# Patient Record
Sex: FEMALE | Race: WHITE | NOT HISPANIC OR LATINO | Employment: OTHER | URBAN - METROPOLITAN AREA
[De-identification: names, ages, dates, MRNs, and addresses within clinical notes are randomized per-mention and may not be internally consistent; named-entity substitution may affect disease eponyms.]

---

## 2017-03-21 ENCOUNTER — FOLLOW UP (OUTPATIENT)
Dept: URBAN - METROPOLITAN AREA CLINIC 33 | Facility: CLINIC | Age: 77
End: 2017-03-21

## 2017-03-21 VITALS — SYSTOLIC BLOOD PRESSURE: 120 MMHG | HEART RATE: 60 BPM | HEIGHT: 55 IN | DIASTOLIC BLOOD PRESSURE: 82 MMHG

## 2017-03-21 DIAGNOSIS — H35.3132: ICD-10-CM

## 2017-03-21 DIAGNOSIS — H34.8320: ICD-10-CM

## 2017-03-21 DIAGNOSIS — H43.813: ICD-10-CM

## 2017-03-21 DIAGNOSIS — H35.372: ICD-10-CM

## 2017-03-21 PROCEDURE — 4177F TALK PT/CRGVR RE AREDS PREV: CPT

## 2017-03-21 PROCEDURE — 92235 FLUORESCEIN ANGRPH MLTIFRAME: CPT

## 2017-03-21 PROCEDURE — G8427 DOCREV CUR MEDS BY ELIG CLIN: HCPCS

## 2017-03-21 PROCEDURE — 2019F DILATED MACUL EXAM DONE: CPT

## 2017-03-21 PROCEDURE — 1036F TOBACCO NON-USER: CPT

## 2017-03-21 PROCEDURE — 92014 COMPRE OPH EXAM EST PT 1/>: CPT

## 2017-03-21 PROCEDURE — 92250 FUNDUS PHOTOGRAPHY W/I&R: CPT

## 2017-03-21 ASSESSMENT — VISUAL ACUITY
OD_SC: 20/20-1
OS_SC: 20/25+1

## 2017-03-21 ASSESSMENT — TONOMETRY
OS_IOP_MMHG: 12
OD_IOP_MMHG: 17

## 2017-09-25 ENCOUNTER — FOLLOW UP (OUTPATIENT)
Dept: URBAN - METROPOLITAN AREA CLINIC 33 | Facility: CLINIC | Age: 77
End: 2017-09-25

## 2017-09-25 VITALS
DIASTOLIC BLOOD PRESSURE: 72 MMHG | SYSTOLIC BLOOD PRESSURE: 130 MMHG | WEIGHT: 112 LBS | BODY MASS INDEX: 21.99 KG/M2 | HEIGHT: 60 IN | HEART RATE: 56 BPM

## 2017-09-25 DIAGNOSIS — H34.8320: ICD-10-CM

## 2017-09-25 DIAGNOSIS — H43.813: ICD-10-CM

## 2017-09-25 DIAGNOSIS — H35.3132: ICD-10-CM

## 2017-09-25 DIAGNOSIS — H35.372: ICD-10-CM

## 2017-09-25 DIAGNOSIS — H02.836: ICD-10-CM

## 2017-09-25 DIAGNOSIS — H02.833: ICD-10-CM

## 2017-09-25 PROCEDURE — 92014 COMPRE OPH EXAM EST PT 1/>: CPT

## 2017-09-25 PROCEDURE — G8417 CALC BMI ABV UP PARAM F/U: HCPCS

## 2017-09-25 PROCEDURE — 92226 OPHTHALMOSCOPY (SUB): CPT

## 2017-09-25 PROCEDURE — 92134 CPTRZ OPH DX IMG PST SGM RTA: CPT

## 2017-09-25 PROCEDURE — 92235 FLUORESCEIN ANGRPH MLTIFRAME: CPT

## 2017-09-25 PROCEDURE — 4177F TALK PT/CRGVR RE AREDS PREV: CPT

## 2017-09-25 PROCEDURE — G8427 DOCREV CUR MEDS BY ELIG CLIN: HCPCS

## 2017-09-25 PROCEDURE — 1036F TOBACCO NON-USER: CPT

## 2017-09-25 PROCEDURE — 2019F DILATED MACUL EXAM DONE: CPT

## 2017-09-25 ASSESSMENT — VISUAL ACUITY
OD_SC: 20/25+2
OS_SC: 20/25-1

## 2017-09-25 ASSESSMENT — TONOMETRY
OD_IOP_MMHG: 21
OS_IOP_MMHG: 15
OD_IOP_MMHG: 16

## 2018-03-26 ENCOUNTER — FOLLOW UP (OUTPATIENT)
Dept: URBAN - METROPOLITAN AREA CLINIC 33 | Facility: CLINIC | Age: 78
End: 2018-03-26

## 2018-03-26 VITALS
SYSTOLIC BLOOD PRESSURE: 138 MMHG | BODY MASS INDEX: 22.58 KG/M2 | HEART RATE: 68 BPM | DIASTOLIC BLOOD PRESSURE: 74 MMHG | HEIGHT: 60 IN | WEIGHT: 115 LBS

## 2018-03-26 DIAGNOSIS — H35.3132: ICD-10-CM

## 2018-03-26 DIAGNOSIS — H43.813: ICD-10-CM

## 2018-03-26 DIAGNOSIS — H35.372: ICD-10-CM

## 2018-03-26 DIAGNOSIS — H40.1110: ICD-10-CM

## 2018-03-26 DIAGNOSIS — H34.8320: ICD-10-CM

## 2018-03-26 PROCEDURE — 92134 CPTRZ OPH DX IMG PST SGM RTA: CPT

## 2018-03-26 PROCEDURE — 92250 FUNDUS PHOTOGRAPHY W/I&R: CPT

## 2018-03-26 PROCEDURE — 67028 INJECTION EYE DRUG: CPT

## 2018-03-26 PROCEDURE — 92014 COMPRE OPH EXAM EST PT 1/>: CPT

## 2018-03-26 PROCEDURE — 92235 FLUORESCEIN ANGRPH MLTIFRAME: CPT

## 2018-03-26 RX ORDER — TIMOLOL MALEATE 6.8 MG/ML: 1 SOLUTION OPHTHALMIC EVERY EVENING

## 2018-03-26 ASSESSMENT — VISUAL ACUITY
OS_SC: 20/30+1
OS_PH: 20/30+2
OD_SC: 20/25-2

## 2018-03-26 ASSESSMENT — TONOMETRY
OD_IOP_MMHG: 24
OS_IOP_MMHG: 16

## 2018-05-09 ENCOUNTER — FOLLOW UP AND POST INJECTION EVALUATION (OUTPATIENT)
Dept: URBAN - METROPOLITAN AREA CLINIC 33 | Facility: CLINIC | Age: 78
End: 2018-05-09

## 2018-05-09 VITALS — HEART RATE: 64 BPM | DIASTOLIC BLOOD PRESSURE: 70 MMHG | SYSTOLIC BLOOD PRESSURE: 130 MMHG | HEIGHT: 55 IN

## 2018-05-09 DIAGNOSIS — H35.3131: ICD-10-CM

## 2018-05-09 DIAGNOSIS — H35.372: ICD-10-CM

## 2018-05-09 DIAGNOSIS — H34.8320: ICD-10-CM

## 2018-05-09 DIAGNOSIS — H44.23: ICD-10-CM

## 2018-05-09 DIAGNOSIS — H43.813: ICD-10-CM

## 2018-05-09 DIAGNOSIS — H40.1110: ICD-10-CM

## 2018-05-09 PROCEDURE — 92012 INTRM OPH EXAM EST PATIENT: CPT

## 2018-05-09 PROCEDURE — 92250 FUNDUS PHOTOGRAPHY W/I&R: CPT

## 2018-05-09 PROCEDURE — 67028 INJECTION EYE DRUG: CPT

## 2018-05-09 ASSESSMENT — VISUAL ACUITY
OS_SC: 20/25+1
OD_SC: 20/25-1

## 2018-05-09 ASSESSMENT — TONOMETRY
OD_IOP_MMHG: 14
OS_IOP_MMHG: 15

## 2018-07-02 ENCOUNTER — FOLLOW UP (OUTPATIENT)
Dept: URBAN - METROPOLITAN AREA CLINIC 33 | Facility: CLINIC | Age: 78
End: 2018-07-02

## 2018-07-02 VITALS — DIASTOLIC BLOOD PRESSURE: 68 MMHG | SYSTOLIC BLOOD PRESSURE: 137 MMHG | HEART RATE: 65 BPM | HEIGHT: 55 IN

## 2018-07-02 DIAGNOSIS — H35.372: ICD-10-CM

## 2018-07-02 DIAGNOSIS — H34.8320: ICD-10-CM

## 2018-07-02 DIAGNOSIS — H40.1110: ICD-10-CM

## 2018-07-02 DIAGNOSIS — H35.3131: ICD-10-CM

## 2018-07-02 DIAGNOSIS — H43.813: ICD-10-CM

## 2018-07-02 DIAGNOSIS — H44.23: ICD-10-CM

## 2018-07-02 PROCEDURE — 67028 INJECTION EYE DRUG: CPT

## 2018-07-02 PROCEDURE — 92250 FUNDUS PHOTOGRAPHY W/I&R: CPT

## 2018-07-02 PROCEDURE — 92014 COMPRE OPH EXAM EST PT 1/>: CPT

## 2018-07-02 PROCEDURE — 92134 CPTRZ OPH DX IMG PST SGM RTA: CPT

## 2018-07-02 ASSESSMENT — VISUAL ACUITY
OD_SC: 20/30-1
OS_SC: 20/25-1

## 2018-07-02 ASSESSMENT — TONOMETRY
OS_IOP_MMHG: 14
OD_IOP_MMHG: 12

## 2018-07-09 ENCOUNTER — IMPORTED ENCOUNTER (OUTPATIENT)
Dept: URBAN - METROPOLITAN AREA CLINIC 43 | Facility: CLINIC | Age: 78
End: 2018-07-09

## 2018-07-17 ENCOUNTER — IMPORTED ENCOUNTER (OUTPATIENT)
Dept: URBAN - METROPOLITAN AREA CLINIC 43 | Facility: CLINIC | Age: 78
End: 2018-07-17

## 2018-07-17 PROBLEM — H35.372: Noted: 2018-07-17

## 2018-07-17 PROBLEM — H40.1134: Noted: 2018-07-17

## 2018-07-24 ENCOUNTER — IMPORTED ENCOUNTER (OUTPATIENT)
Dept: URBAN - METROPOLITAN AREA CLINIC 43 | Facility: CLINIC | Age: 78
End: 2018-07-24

## 2018-08-29 ENCOUNTER — FOLLOW UP (OUTPATIENT)
Dept: URBAN - METROPOLITAN AREA CLINIC 33 | Facility: CLINIC | Age: 78
End: 2018-08-29

## 2018-08-29 DIAGNOSIS — H34.8320: ICD-10-CM

## 2018-08-29 DIAGNOSIS — H35.3131: ICD-10-CM

## 2018-08-29 DIAGNOSIS — H35.372: ICD-10-CM

## 2018-08-29 DIAGNOSIS — H40.1110: ICD-10-CM

## 2018-08-29 DIAGNOSIS — H44.23: ICD-10-CM

## 2018-08-29 DIAGNOSIS — H43.813: ICD-10-CM

## 2018-08-29 PROCEDURE — 67028 INJECTION EYE DRUG: CPT

## 2018-08-29 PROCEDURE — 92012 INTRM OPH EXAM EST PATIENT: CPT

## 2018-08-29 PROCEDURE — 92250 FUNDUS PHOTOGRAPHY W/I&R: CPT

## 2018-08-29 PROCEDURE — 92134 CPTRZ OPH DX IMG PST SGM RTA: CPT

## 2018-08-29 ASSESSMENT — VISUAL ACUITY
OS_SC: 20/25-1
OD_SC: 20/25

## 2018-08-29 ASSESSMENT — TONOMETRY
OS_IOP_MMHG: 17
OD_IOP_MMHG: 16

## 2018-10-29 ENCOUNTER — FOLLOW UP (OUTPATIENT)
Dept: URBAN - METROPOLITAN AREA CLINIC 33 | Facility: CLINIC | Age: 78
End: 2018-10-29

## 2018-10-29 DIAGNOSIS — H44.23: ICD-10-CM

## 2018-10-29 DIAGNOSIS — H35.372: ICD-10-CM

## 2018-10-29 DIAGNOSIS — H35.3131: ICD-10-CM

## 2018-10-29 DIAGNOSIS — H34.8320: ICD-10-CM

## 2018-10-29 DIAGNOSIS — H40.1110: ICD-10-CM

## 2018-10-29 DIAGNOSIS — H43.813: ICD-10-CM

## 2018-10-29 PROCEDURE — 92134 CPTRZ OPH DX IMG PST SGM RTA: CPT

## 2018-10-29 PROCEDURE — 92014 COMPRE OPH EXAM EST PT 1/>: CPT

## 2018-10-29 PROCEDURE — 92250 FUNDUS PHOTOGRAPHY W/I&R: CPT

## 2018-10-29 PROCEDURE — 67028 INJECTION EYE DRUG: CPT

## 2018-10-29 ASSESSMENT — VISUAL ACUITY
OD_SC: 20/25+2
OS_SC: 20/25+1

## 2018-10-29 ASSESSMENT — TONOMETRY
OD_IOP_MMHG: 19
OS_IOP_MMHG: 13

## 2018-12-17 ENCOUNTER — FOLLOW UP AND POST INJECTION EVALUATION (OUTPATIENT)
Dept: URBAN - METROPOLITAN AREA CLINIC 33 | Facility: CLINIC | Age: 78
End: 2018-12-17

## 2018-12-17 DIAGNOSIS — H35.372: ICD-10-CM

## 2018-12-17 DIAGNOSIS — H34.8320: ICD-10-CM

## 2018-12-17 DIAGNOSIS — H40.1110: ICD-10-CM

## 2018-12-17 DIAGNOSIS — H35.3131: ICD-10-CM

## 2018-12-17 DIAGNOSIS — H43.813: ICD-10-CM

## 2018-12-17 DIAGNOSIS — H44.23: ICD-10-CM

## 2018-12-17 PROCEDURE — 92014 COMPRE OPH EXAM EST PT 1/>: CPT

## 2018-12-17 PROCEDURE — 92250 FUNDUS PHOTOGRAPHY W/I&R: CPT

## 2018-12-17 PROCEDURE — 67028 INJECTION EYE DRUG: CPT

## 2018-12-17 ASSESSMENT — TONOMETRY
OD_IOP_MMHG: 12
OS_IOP_MMHG: 12

## 2018-12-17 ASSESSMENT — VISUAL ACUITY
OD_SC: 20/25+2
OS_SC: 20/25+2

## 2019-01-09 ENCOUNTER — IMPORTED ENCOUNTER (OUTPATIENT)
Dept: URBAN - METROPOLITAN AREA CLINIC 43 | Facility: CLINIC | Age: 79
End: 2019-01-09

## 2019-01-09 PROBLEM — H40.1134: Noted: 2019-01-09

## 2019-01-09 PROBLEM — H02.831: Noted: 2019-01-09

## 2019-01-09 PROBLEM — H02.834: Noted: 2019-01-09

## 2019-02-14 ENCOUNTER — IMPORTED ENCOUNTER (OUTPATIENT)
Dept: URBAN - METROPOLITAN AREA CLINIC 43 | Facility: CLINIC | Age: 79
End: 2019-02-14

## 2019-02-14 PROBLEM — H02.831: Noted: 2019-02-14

## 2019-02-14 PROBLEM — H02.834: Noted: 2019-02-14

## 2019-02-14 PROBLEM — Z96.1: Noted: 2019-02-14

## 2019-02-18 ENCOUNTER — FOLLOW UP AND POST INJECTION EVALUATION (OUTPATIENT)
Dept: URBAN - METROPOLITAN AREA CLINIC 33 | Facility: CLINIC | Age: 79
End: 2019-02-18

## 2019-02-18 VITALS
HEIGHT: 60 IN | BODY MASS INDEX: 22.38 KG/M2 | SYSTOLIC BLOOD PRESSURE: 134 MMHG | HEART RATE: 62 BPM | WEIGHT: 114 LBS | DIASTOLIC BLOOD PRESSURE: 60 MMHG

## 2019-02-18 DIAGNOSIS — H40.1110: ICD-10-CM

## 2019-02-18 DIAGNOSIS — H35.372: ICD-10-CM

## 2019-02-18 DIAGNOSIS — H35.3131: ICD-10-CM

## 2019-02-18 DIAGNOSIS — H43.813: ICD-10-CM

## 2019-02-18 DIAGNOSIS — H34.8320: ICD-10-CM

## 2019-02-18 DIAGNOSIS — H44.23: ICD-10-CM

## 2019-02-18 PROCEDURE — 92250 FUNDUS PHOTOGRAPHY W/I&R: CPT

## 2019-02-18 PROCEDURE — 92012 INTRM OPH EXAM EST PATIENT: CPT

## 2019-02-18 PROCEDURE — 92235 FLUORESCEIN ANGRPH MLTIFRAME: CPT

## 2019-02-18 PROCEDURE — 92134 CPTRZ OPH DX IMG PST SGM RTA: CPT

## 2019-02-18 PROCEDURE — 67028 INJECTION EYE DRUG: CPT

## 2019-02-18 ASSESSMENT — VISUAL ACUITY
OS_SC: 20/25+1
OD_SC: 20/25+1

## 2019-02-18 ASSESSMENT — TONOMETRY
OS_IOP_MMHG: 14
OD_IOP_MMHG: 20

## 2019-02-28 ENCOUNTER — IMPORTED ENCOUNTER (OUTPATIENT)
Dept: URBAN - METROPOLITAN AREA CLINIC 43 | Facility: CLINIC | Age: 79
End: 2019-02-28

## 2019-02-28 PROBLEM — H02.834: Noted: 2019-02-28

## 2019-02-28 PROBLEM — H02.831: Noted: 2019-02-28

## 2019-03-08 ENCOUNTER — IMPORTED ENCOUNTER (OUTPATIENT)
Dept: URBAN - METROPOLITAN AREA CLINIC 43 | Facility: CLINIC | Age: 79
End: 2019-03-08

## 2019-03-08 PROBLEM — H02.831: Noted: 2019-03-08

## 2019-03-08 PROBLEM — H02.834: Noted: 2019-03-08

## 2019-03-14 ENCOUNTER — IMPORTED ENCOUNTER (OUTPATIENT)
Dept: URBAN - METROPOLITAN AREA CLINIC 43 | Facility: CLINIC | Age: 79
End: 2019-03-14

## 2019-03-14 PROBLEM — H02.834: Noted: 2019-03-14

## 2019-04-10 ENCOUNTER — CLINICAL PROCEDURE AND DIAGNOSTIC TESTING ONLY (OUTPATIENT)
Dept: URBAN - METROPOLITAN AREA CLINIC 33 | Facility: CLINIC | Age: 79
End: 2019-04-10

## 2019-04-10 DIAGNOSIS — H34.8320: ICD-10-CM

## 2019-04-10 DIAGNOSIS — H35.3131: ICD-10-CM

## 2019-04-10 PROCEDURE — 67028 INJECTION EYE DRUG: CPT

## 2019-04-10 PROCEDURE — 92134 CPTRZ OPH DX IMG PST SGM RTA: CPT

## 2019-04-10 ASSESSMENT — VISUAL ACUITY
OS_SC: 20/25
OD_SC: 20/25-2

## 2019-04-10 ASSESSMENT — TONOMETRY
OS_IOP_MMHG: 16
OD_IOP_MMHG: 16

## 2019-06-05 ENCOUNTER — FOLLOW UP AND POST INJECTION EVALUATION (OUTPATIENT)
Dept: URBAN - METROPOLITAN AREA CLINIC 33 | Facility: CLINIC | Age: 79
End: 2019-06-05

## 2019-06-05 DIAGNOSIS — H40.1110: ICD-10-CM

## 2019-06-05 DIAGNOSIS — H34.8320: ICD-10-CM

## 2019-06-05 DIAGNOSIS — H44.23: ICD-10-CM

## 2019-06-05 DIAGNOSIS — H35.372: ICD-10-CM

## 2019-06-05 DIAGNOSIS — H35.3131: ICD-10-CM

## 2019-06-05 DIAGNOSIS — H43.813: ICD-10-CM

## 2019-06-05 PROCEDURE — 92134 CPTRZ OPH DX IMG PST SGM RTA: CPT

## 2019-06-05 PROCEDURE — 92014 COMPRE OPH EXAM EST PT 1/>: CPT

## 2019-06-05 PROCEDURE — 92250 FUNDUS PHOTOGRAPHY W/I&R: CPT

## 2019-06-05 PROCEDURE — 67028 INJECTION EYE DRUG: CPT

## 2019-06-05 ASSESSMENT — VISUAL ACUITY
OD_SC: 20/25+2
OS_SC: 20/25-2

## 2019-06-05 ASSESSMENT — TONOMETRY
OS_IOP_MMHG: 16
OD_IOP_MMHG: 17

## 2019-06-27 ENCOUNTER — PREPPED CHART (OUTPATIENT)
Dept: URBAN - METROPOLITAN AREA CLINIC 32 | Facility: CLINIC | Age: 79
End: 2019-06-27

## 2019-06-27 ENCOUNTER — IMPORTED ENCOUNTER (OUTPATIENT)
Dept: URBAN - METROPOLITAN AREA CLINIC 43 | Facility: CLINIC | Age: 79
End: 2019-06-27

## 2019-06-27 PROBLEM — H40.1134: Noted: 2019-06-27

## 2019-06-27 PROBLEM — H35.373: Noted: 2019-06-27

## 2019-06-27 PROBLEM — H34.8320: Noted: 2019-06-27

## 2019-09-09 ENCOUNTER — FOLLOW UP AND POST INJECTION EVALUATION (OUTPATIENT)
Dept: URBAN - METROPOLITAN AREA CLINIC 33 | Facility: CLINIC | Age: 79
End: 2019-09-09

## 2019-09-09 DIAGNOSIS — H40.1110: ICD-10-CM

## 2019-09-09 DIAGNOSIS — H44.23: ICD-10-CM

## 2019-09-09 DIAGNOSIS — H43.813: ICD-10-CM

## 2019-09-09 DIAGNOSIS — H34.8320: ICD-10-CM

## 2019-09-09 DIAGNOSIS — H35.372: ICD-10-CM

## 2019-09-09 DIAGNOSIS — H35.3131: ICD-10-CM

## 2019-09-09 PROCEDURE — 92014 COMPRE OPH EXAM EST PT 1/>: CPT

## 2019-09-09 PROCEDURE — 92250 FUNDUS PHOTOGRAPHY W/I&R: CPT

## 2019-09-09 ASSESSMENT — TONOMETRY
OD_IOP_MMHG: 13
OS_IOP_MMHG: 13

## 2019-09-09 ASSESSMENT — VISUAL ACUITY
OS_SC: 20/25+1
OD_SC: 20/25+2

## 2019-12-02 ENCOUNTER — FOLLOW UP (OUTPATIENT)
Dept: URBAN - METROPOLITAN AREA CLINIC 33 | Facility: CLINIC | Age: 79
End: 2019-12-02

## 2019-12-02 DIAGNOSIS — H34.8320: ICD-10-CM

## 2019-12-02 DIAGNOSIS — H43.813: ICD-10-CM

## 2019-12-02 DIAGNOSIS — H35.372: ICD-10-CM

## 2019-12-02 DIAGNOSIS — H40.1110: ICD-10-CM

## 2019-12-02 DIAGNOSIS — H44.23: ICD-10-CM

## 2019-12-02 DIAGNOSIS — H35.3131: ICD-10-CM

## 2019-12-02 PROCEDURE — 92014 COMPRE OPH EXAM EST PT 1/>: CPT

## 2019-12-02 PROCEDURE — 92250 FUNDUS PHOTOGRAPHY W/I&R: CPT

## 2019-12-02 ASSESSMENT — VISUAL ACUITY
OS_SC: 20/20-2
OD_SC: 20/20-2

## 2019-12-02 ASSESSMENT — TONOMETRY
OS_IOP_MMHG: 13
OD_IOP_MMHG: 13

## 2019-12-24 ASSESSMENT — VISUAL ACUITY
OS_CC: J1+
OS_SC: 20/25+2
OD_SC: 20/25-2
OD_CC: J1+

## 2019-12-24 ASSESSMENT — TONOMETRY
OD_IOP_MMHG: 15
OS_IOP_MMHG: 14

## 2019-12-26 ASSESSMENT — PACHYMETRY
OD_CT_UM: 551
OS_CT_UM: 525

## 2020-01-06 ENCOUNTER — FOLLOW UP (OUTPATIENT)
Dept: URBAN - METROPOLITAN AREA CLINIC 32 | Facility: CLINIC | Age: 80
End: 2020-01-06

## 2020-01-06 DIAGNOSIS — H40.1132: ICD-10-CM

## 2020-01-06 PROCEDURE — 92083 EXTENDED VISUAL FIELD XM: CPT

## 2020-01-06 PROCEDURE — 99213 OFFICE O/P EST LOW 20 MIN: CPT

## 2020-01-06 ASSESSMENT — TONOMETRY
OS_IOP_MMHG: 16
OD_IOP_MMHG: 16

## 2020-01-06 ASSESSMENT — VISUAL ACUITY
OD_SC: 20/30+2
OS_SC: 20/25
OU_SC: 20/25-2

## 2020-03-03 ENCOUNTER — FOLLOW UP (OUTPATIENT)
Dept: URBAN - METROPOLITAN AREA CLINIC 33 | Facility: CLINIC | Age: 80
End: 2020-03-03

## 2020-03-03 VITALS — HEIGHT: 60 IN | WEIGHT: 111 LBS | BODY MASS INDEX: 21.79 KG/M2

## 2020-03-03 DIAGNOSIS — H44.23: ICD-10-CM

## 2020-03-03 DIAGNOSIS — H35.372: ICD-10-CM

## 2020-03-03 DIAGNOSIS — H34.8320: ICD-10-CM

## 2020-03-03 DIAGNOSIS — H40.1132: ICD-10-CM

## 2020-03-03 DIAGNOSIS — H35.3131: ICD-10-CM

## 2020-03-03 DIAGNOSIS — H43.813: ICD-10-CM

## 2020-03-03 PROCEDURE — 92250 FUNDUS PHOTOGRAPHY W/I&R: CPT

## 2020-03-03 PROCEDURE — 92014 COMPRE OPH EXAM EST PT 1/>: CPT

## 2020-03-03 ASSESSMENT — VISUAL ACUITY
OD_SC: 20/25+1
OS_SC: 20/20

## 2020-03-03 ASSESSMENT — TONOMETRY
OD_IOP_MMHG: 13
OS_IOP_MMHG: 14

## 2020-03-19 NOTE — PROCEDURE NOTE: SURGICAL
<p>Prior to commencing surgery patient identification, surgical procedure, site, and side were confirmed by Dr. Hayde Stanford. Following topical proparacaine anesthesia, the patient was positioned at the YAG laser, a contact lens coupled to the cornea with methylcellulose and an axial posterior capsulotomy performed without complication using 2.7 Mj x 37. Excess methylcellulose was washed from the eye, one drop of Alphagan was instilled and the patient returned to the holding area having tolerated the procedure well and without complication. </p><p>MRN:[id number]</p>

## 2020-04-19 ASSESSMENT — KERATOMETRY
OD_AXISANGLE2_DEGREES: 59
OS_K1POWER_DIOPTERS: 45
OD_K1POWER_DIOPTERS: 45
OS_AXISANGLE2_DEGREES: 80
OS_AXISANGLE_DEGREES: 170
OD_K2POWER_DIOPTERS: 44.75
OS_K2POWER_DIOPTERS: 45.75
OD_AXISANGLE_DEGREES: 149

## 2020-04-19 ASSESSMENT — VISUAL ACUITY
OD_SC: 20/30+2
OD_SC: 20/30-1
OD_SC: 20/30
OD_SC: 20/20-2
OS_SC: 20/25
OS_SC: 20/30-1
OS_SC: 20/20-1
OS_CC: J1+-3
OD_SC: 20/30+2
OS_SC: 20/30+2
OD_SC: 20/25 -2
OD_SC: 20/25
OS_SC: 20/30+2
OS_SC: 20/20-2
OD_CC: J1+-2
OD_SC: 20/25-1
OS_SC: 20/25 +2
OS_SC: 20/30
OD_SC: 20/30

## 2020-04-19 ASSESSMENT — TONOMETRY
OS_IOP_MMHG: 14.0
OD_IOP_MMHG: 15.0
OS_IOP_MMHG: 14.0
OD_IOP_MMHG: 16.0
OD_IOP_MMHG: 16.0
OS_IOP_MMHG: 16.0
OS_IOP_MMHG: 16.0
OD_IOP_MMHG: 17.0

## 2020-07-01 ENCOUNTER — FOLLOW UP (OUTPATIENT)
Dept: URBAN - METROPOLITAN AREA CLINIC 33 | Facility: CLINIC | Age: 80
End: 2020-07-01

## 2020-07-01 DIAGNOSIS — H44.23: ICD-10-CM

## 2020-07-01 DIAGNOSIS — H43.813: ICD-10-CM

## 2020-07-01 DIAGNOSIS — H40.1132: ICD-10-CM

## 2020-07-01 DIAGNOSIS — H34.8320: ICD-10-CM

## 2020-07-01 DIAGNOSIS — H35.3131: ICD-10-CM

## 2020-07-01 DIAGNOSIS — H35.372: ICD-10-CM

## 2020-07-01 PROCEDURE — 92250 FUNDUS PHOTOGRAPHY W/I&R: CPT

## 2020-07-01 PROCEDURE — 92014 COMPRE OPH EXAM EST PT 1/>: CPT

## 2020-07-01 ASSESSMENT — TONOMETRY
OD_IOP_MMHG: 16
OS_IOP_MMHG: 14

## 2020-07-01 ASSESSMENT — VISUAL ACUITY
OS_SC: 20/25+2
OD_SC: 20/25+1

## 2020-11-10 ENCOUNTER — FOLLOW UP (OUTPATIENT)
Dept: URBAN - METROPOLITAN AREA CLINIC 33 | Facility: CLINIC | Age: 80
End: 2020-11-10

## 2020-11-10 DIAGNOSIS — H35.372: ICD-10-CM

## 2020-11-10 DIAGNOSIS — H44.23: ICD-10-CM

## 2020-11-10 DIAGNOSIS — H34.8320: ICD-10-CM

## 2020-11-10 DIAGNOSIS — H35.3131: ICD-10-CM

## 2020-11-10 DIAGNOSIS — H40.1132: ICD-10-CM

## 2020-11-10 DIAGNOSIS — H43.813: ICD-10-CM

## 2020-11-10 PROCEDURE — 92014 COMPRE OPH EXAM EST PT 1/>: CPT

## 2020-11-10 PROCEDURE — 92250 FUNDUS PHOTOGRAPHY W/I&R: CPT

## 2020-11-10 ASSESSMENT — TONOMETRY
OD_IOP_MMHG: 18
OS_IOP_MMHG: 18

## 2020-11-10 ASSESSMENT — VISUAL ACUITY
OD_SC: 20/20-2
OS_SC: 20/25+1

## 2020-12-16 ENCOUNTER — ESTABLISHED COMPREHENSIVE EXAM (OUTPATIENT)
Dept: URBAN - METROPOLITAN AREA CLINIC 32 | Facility: CLINIC | Age: 80
End: 2020-12-16

## 2020-12-16 DIAGNOSIS — H35.373: ICD-10-CM

## 2020-12-16 DIAGNOSIS — H40.1132: ICD-10-CM

## 2020-12-16 DIAGNOSIS — H04.123: ICD-10-CM

## 2020-12-16 DIAGNOSIS — Z96.1: ICD-10-CM

## 2020-12-16 DIAGNOSIS — H34.8322: ICD-10-CM

## 2020-12-16 PROCEDURE — 92014 COMPRE OPH EXAM EST PT 1/>: CPT

## 2020-12-16 PROCEDURE — 92133 CPTRZD OPH DX IMG PST SGM ON: CPT

## 2020-12-16 PROCEDURE — 92015 DETERMINE REFRACTIVE STATE: CPT

## 2020-12-16 ASSESSMENT — VISUAL ACUITY
OD_SC: 20/40
OS_SC: 20/30
OS_SC: J3
OD_SC: J1
OU_SC: 20/30-1

## 2020-12-16 ASSESSMENT — KERATOMETRY
OS_AXISANGLE2_DEGREES: 78
OD_AXISANGLE_DEGREES: 100
OD_K2POWER_DIOPTERS: 45.50
OS_K1POWER_DIOPTERS: 47.00
OD_K1POWER_DIOPTERS: 47.00
OS_K2POWER_DIOPTERS: 46.00
OD_AXISANGLE2_DEGREES: 10
OS_AXISANGLE_DEGREES: 168

## 2020-12-16 ASSESSMENT — TONOMETRY
OS_IOP_MMHG: 12
OD_IOP_MMHG: 14

## 2021-03-24 ENCOUNTER — FOLLOW UP (OUTPATIENT)
Dept: URBAN - METROPOLITAN AREA CLINIC 33 | Facility: CLINIC | Age: 81
End: 2021-03-24

## 2021-03-24 VITALS — BODY MASS INDEX: 21.01 KG/M2 | HEIGHT: 60 IN | WEIGHT: 107 LBS

## 2021-03-24 DIAGNOSIS — H35.372: ICD-10-CM

## 2021-03-24 DIAGNOSIS — H40.1132: ICD-10-CM

## 2021-03-24 DIAGNOSIS — H35.3131: ICD-10-CM

## 2021-03-24 DIAGNOSIS — H43.813: ICD-10-CM

## 2021-03-24 DIAGNOSIS — H34.8320: ICD-10-CM

## 2021-03-24 DIAGNOSIS — H44.23: ICD-10-CM

## 2021-03-24 PROCEDURE — 92014 COMPRE OPH EXAM EST PT 1/>: CPT

## 2021-03-24 PROCEDURE — 92250 FUNDUS PHOTOGRAPHY W/I&R: CPT

## 2021-03-24 ASSESSMENT — VISUAL ACUITY
OD_SC: 20/25-2
OS_SC: 20/25+1

## 2021-03-24 ASSESSMENT — TONOMETRY
OS_IOP_MMHG: 11
OD_IOP_MMHG: 12

## 2021-06-09 ENCOUNTER — IOP CHECK (OUTPATIENT)
Dept: URBAN - METROPOLITAN AREA CLINIC 32 | Facility: CLINIC | Age: 81
End: 2021-06-09

## 2021-06-09 DIAGNOSIS — H40.1132: ICD-10-CM

## 2021-06-09 PROCEDURE — 99213 OFFICE O/P EST LOW 20 MIN: CPT

## 2021-06-09 PROCEDURE — 92083 EXTENDED VISUAL FIELD XM: CPT

## 2021-06-09 ASSESSMENT — KERATOMETRY
OD_K1POWER_DIOPTERS: 47.00
OS_AXISANGLE2_DEGREES: 78
OD_K2POWER_DIOPTERS: 45.50
OS_K1POWER_DIOPTERS: 47.00
OD_AXISANGLE2_DEGREES: 10
OD_AXISANGLE_DEGREES: 100
OS_K2POWER_DIOPTERS: 46.00
OS_AXISANGLE_DEGREES: 168

## 2021-06-09 ASSESSMENT — TONOMETRY
OD_IOP_MMHG: 16
OS_IOP_MMHG: 12

## 2021-06-09 ASSESSMENT — VISUAL ACUITY
OS_SC: 20/30-2
OD_SC: 20/30-1

## 2021-10-12 ENCOUNTER — FOLLOW UP (OUTPATIENT)
Dept: URBAN - METROPOLITAN AREA CLINIC 33 | Facility: CLINIC | Age: 81
End: 2021-10-12

## 2021-10-12 DIAGNOSIS — H35.3131: ICD-10-CM

## 2021-10-12 DIAGNOSIS — H04.123: ICD-10-CM

## 2021-10-12 DIAGNOSIS — H35.371: ICD-10-CM

## 2021-10-12 DIAGNOSIS — H34.8320: ICD-10-CM

## 2021-10-12 DIAGNOSIS — H44.23: ICD-10-CM

## 2021-10-12 DIAGNOSIS — H43.813: ICD-10-CM

## 2021-10-12 DIAGNOSIS — H40.1132: ICD-10-CM

## 2021-10-12 DIAGNOSIS — H35.372: ICD-10-CM

## 2021-10-12 PROCEDURE — 92014 COMPRE OPH EXAM EST PT 1/>: CPT

## 2021-10-12 PROCEDURE — 92134 CPTRZ OPH DX IMG PST SGM RTA: CPT

## 2021-10-12 ASSESSMENT — VISUAL ACUITY
OD_SC: 20/30+2
OS_SC: 20/40+1

## 2021-10-12 ASSESSMENT — TONOMETRY
OS_IOP_MMHG: 17
OD_IOP_MMHG: 18

## 2022-01-14 ENCOUNTER — COMPREHENSIVE EXAM (OUTPATIENT)
Dept: URBAN - METROPOLITAN AREA CLINIC 32 | Facility: CLINIC | Age: 82
End: 2022-01-14

## 2022-01-14 DIAGNOSIS — Z96.1: ICD-10-CM

## 2022-01-14 DIAGNOSIS — H40.1132: ICD-10-CM

## 2022-01-14 DIAGNOSIS — H04.123: ICD-10-CM

## 2022-01-14 PROCEDURE — 92133 CPTRZD OPH DX IMG PST SGM ON: CPT

## 2022-01-14 PROCEDURE — 92014 COMPRE OPH EXAM EST PT 1/>: CPT

## 2022-01-14 PROCEDURE — 92015 DETERMINE REFRACTIVE STATE: CPT

## 2022-01-14 ASSESSMENT — TONOMETRY
OD_IOP_MMHG: 18
OS_IOP_MMHG: 20

## 2022-01-14 ASSESSMENT — VISUAL ACUITY
OS_SC: 20/30-1
OD_SC: J1
OS_CC: J5
OS_CC: 20/30-1
OD_SC: 20/30-1
OS_SC: J10
OD_CC: J1
OD_CC: 20/30-2

## 2022-01-14 ASSESSMENT — KERATOMETRY
OS_AXISANGLE2_DEGREES: 160
OD_K1POWER_DIOPTERS: 46.25
OS_AXISANGLE_DEGREES: 70
OD_AXISANGLE_DEGREES: 100
OS_K1POWER_DIOPTERS: 46.25
OD_AXISANGLE2_DEGREES: 10
OS_K2POWER_DIOPTERS: 45.25
OD_K2POWER_DIOPTERS: 45.25

## 2022-01-28 ENCOUNTER — FOLLOW UP (OUTPATIENT)
Dept: URBAN - METROPOLITAN AREA CLINIC 32 | Facility: CLINIC | Age: 82
End: 2022-01-28

## 2022-01-28 DIAGNOSIS — H40.1132: ICD-10-CM

## 2022-01-28 PROCEDURE — 99212 OFFICE O/P EST SF 10 MIN: CPT

## 2022-01-28 ASSESSMENT — TONOMETRY
OD_IOP_MMHG: 14
OS_IOP_MMHG: 13

## 2022-01-28 ASSESSMENT — KERATOMETRY
OS_AXISANGLE2_DEGREES: 160
OD_K2POWER_DIOPTERS: 45.25
OD_AXISANGLE_DEGREES: 100
OS_AXISANGLE_DEGREES: 70
OD_K1POWER_DIOPTERS: 46.25
OS_K2POWER_DIOPTERS: 45.25
OS_K1POWER_DIOPTERS: 46.25
OD_AXISANGLE2_DEGREES: 10

## 2022-01-28 ASSESSMENT — VISUAL ACUITY
OD_SC: 20/30-1
OS_SC: 20/30+2

## 2022-04-13 ENCOUNTER — FOLLOW UP (OUTPATIENT)
Dept: URBAN - METROPOLITAN AREA CLINIC 33 | Facility: CLINIC | Age: 82
End: 2022-04-13

## 2022-04-13 VITALS — HEIGHT: 59 IN | WEIGHT: 110 LBS | BODY MASS INDEX: 22.18 KG/M2

## 2022-04-13 DIAGNOSIS — H35.3131: ICD-10-CM

## 2022-04-13 DIAGNOSIS — H35.373: ICD-10-CM

## 2022-04-13 DIAGNOSIS — H40.1132: ICD-10-CM

## 2022-04-13 DIAGNOSIS — H02.833: ICD-10-CM

## 2022-04-13 DIAGNOSIS — H44.23: ICD-10-CM

## 2022-04-13 DIAGNOSIS — H02.836: ICD-10-CM

## 2022-04-13 DIAGNOSIS — H43.813: ICD-10-CM

## 2022-04-13 DIAGNOSIS — H04.123: ICD-10-CM

## 2022-04-13 DIAGNOSIS — H34.8320: ICD-10-CM

## 2022-04-13 PROCEDURE — 92014 COMPRE OPH EXAM EST PT 1/>: CPT

## 2022-04-13 PROCEDURE — 92250 FUNDUS PHOTOGRAPHY W/I&R: CPT

## 2022-04-13 PROCEDURE — 92134 CPTRZ OPH DX IMG PST SGM RTA: CPT

## 2022-04-13 ASSESSMENT — VISUAL ACUITY
OD_SC: 20/25-1
OS_SC: 20/25-1

## 2022-04-13 ASSESSMENT — TONOMETRY
OD_IOP_MMHG: 17
OS_IOP_MMHG: 17

## 2022-05-11 ENCOUNTER — FOLLOW UP (OUTPATIENT)
Dept: URBAN - METROPOLITAN AREA CLINIC 32 | Facility: CLINIC | Age: 82
End: 2022-05-11

## 2022-05-11 DIAGNOSIS — H40.1132: ICD-10-CM

## 2022-05-11 PROCEDURE — 92020 GONIOSCOPY: CPT

## 2022-05-11 PROCEDURE — 99214 OFFICE O/P EST MOD 30 MIN: CPT

## 2022-05-11 ASSESSMENT — KERATOMETRY
OS_AXISANGLE_DEGREES: 70
OD_AXISANGLE_DEGREES: 100
OS_K2POWER_DIOPTERS: 45.25
OD_K2POWER_DIOPTERS: 45.25
OD_K1POWER_DIOPTERS: 46.25
OD_AXISANGLE2_DEGREES: 10
OS_K1POWER_DIOPTERS: 46.25
OS_AXISANGLE2_DEGREES: 160

## 2022-05-11 ASSESSMENT — TONOMETRY
OD_IOP_MMHG: 17
OS_IOP_MMHG: 16

## 2022-05-11 ASSESSMENT — VISUAL ACUITY
OS_SC: 20/40
OD_SC: 20/30-1

## 2022-06-04 ENCOUNTER — TELEPHONE ENCOUNTER (OUTPATIENT)
Dept: URBAN - METROPOLITAN AREA CLINIC 68 | Facility: CLINIC | Age: 82
End: 2022-06-04

## 2022-06-05 ENCOUNTER — TELEPHONE ENCOUNTER (OUTPATIENT)
Dept: URBAN - METROPOLITAN AREA CLINIC 68 | Facility: CLINIC | Age: 82
End: 2022-06-05

## 2022-06-05 RX ORDER — EZETIMIBE 10 MG/1
ZETIA( 10MG ORAL  DAILY ) ACTIVE -HX ENTRY TABLET ORAL DAILY
Status: ACTIVE | COMMUNITY
Start: 2014-12-18

## 2022-06-05 RX ORDER — COLESEVELAM HYDROCHLORIDE 625 MG/1
WELCHOL( 625MG ORAL 2 TABLETS BID BID ) ACTIVE -HX ENTRY TABLET, FILM COATED ORAL BID
Status: ACTIVE | COMMUNITY
Start: 2014-12-18

## 2022-06-05 RX ORDER — FUROSEMIDE 20 MG/1
LASIX( 20MG ORAL 1/2 TAB DAILY ) ACTIVE -HX ENTRY TABLET ORAL DAILY
Status: ACTIVE | COMMUNITY
Start: 2014-12-18

## 2022-06-05 RX ORDER — ESTRADIOL 2 MG/1
ESTRING( 2MG VAGINAL  AS DIRECTED ) ACTIVE -HX ENTRY RING VAGINAL AS DIRECTED
Status: ACTIVE | COMMUNITY
Start: 2014-12-18

## 2022-06-05 RX ORDER — PROGESTERONE 100 MG/1
PROMETRIUM( 100MG ORAL  AS DIRECTED ) ACTIVE -HX ENTRY CAPSULE ORAL AS DIRECTED
Status: ACTIVE | COMMUNITY
Start: 2014-12-18

## 2022-06-05 RX ORDER — METOPROLOL SUCCINATE 50 MG/1
TOPROL XL( 50MG ORAL 1 1/2 TABS DAILY ) ACTIVE -HX ENTRY TABLET, EXTENDED RELEASE ORAL DAILY
Status: ACTIVE | COMMUNITY
Start: 2014-12-18

## 2022-06-05 RX ORDER — VALSARTAN 80 MG/1
DIOVAN( 80MG ORAL  DAILY ) ACTIVE -HX ENTRY TABLET ORAL DAILY
Status: ACTIVE | COMMUNITY
Start: 2014-12-18

## 2022-06-05 RX ORDER — LINACLOTIDE 145 UG/1
CAPSULE, GELATIN COATED ORAL DAILY
Qty: 30 | Refills: 30 | Status: ACTIVE | COMMUNITY
Start: 2014-12-18

## 2022-06-05 RX ORDER — MULTIVITAMIN
MULTIPLE VITAMIN(  ORAL  DAILY ) ACTIVE -HX ENTRY TABLET ORAL DAILY
Status: ACTIVE | COMMUNITY
Start: 2014-12-18

## 2022-06-06 ENCOUNTER — SURGERY/PROCEDURE (OUTPATIENT)
Dept: URBAN - METROPOLITAN AREA CLINIC 32 | Facility: CLINIC | Age: 82
End: 2022-06-06

## 2022-06-06 DIAGNOSIS — H40.1112: ICD-10-CM

## 2022-06-06 PROCEDURE — 65855 TRABECULOPLASTY LASER SURG: CPT

## 2022-06-09 ASSESSMENT — KERATOMETRY
OS_K1POWER_DIOPTERS: 46.25
OS_K2POWER_DIOPTERS: 45.25
OD_AXISANGLE_DEGREES: 100
OD_K1POWER_DIOPTERS: 46.25
OS_AXISANGLE2_DEGREES: 160
OS_AXISANGLE_DEGREES: 70
OD_AXISANGLE2_DEGREES: 10
OD_K2POWER_DIOPTERS: 45.25

## 2022-06-13 ASSESSMENT — KERATOMETRY
OS_AXISANGLE_DEGREES: 70
OS_K2POWER_DIOPTERS: 45.25
OD_K1POWER_DIOPTERS: 46.25
OS_AXISANGLE2_DEGREES: 160
OD_AXISANGLE_DEGREES: 100
OS_K1POWER_DIOPTERS: 46.25
OD_K2POWER_DIOPTERS: 45.25
OD_AXISANGLE2_DEGREES: 10

## 2022-06-14 ENCOUNTER — FOLLOW UP (OUTPATIENT)
Dept: URBAN - METROPOLITAN AREA CLINIC 32 | Facility: CLINIC | Age: 82
End: 2022-06-14

## 2022-06-14 DIAGNOSIS — H40.1112: ICD-10-CM

## 2022-06-14 PROCEDURE — 92012 INTRM OPH EXAM EST PATIENT: CPT

## 2022-06-14 ASSESSMENT — VISUAL ACUITY
OD_SC: 20/30
OS_SC: 20/30

## 2022-06-14 ASSESSMENT — TONOMETRY
OD_IOP_MMHG: 16
OS_IOP_MMHG: 16

## 2022-06-25 ENCOUNTER — TELEPHONE ENCOUNTER (OUTPATIENT)
Age: 82
End: 2022-06-25

## 2022-06-25 RX ORDER — SODIUM SULFATE, POTASSIUM SULFATE, MAGNESIUM SULFATE 17.5; 3.13; 1.6 G/ML; G/ML; G/ML
SOLUTION, CONCENTRATE ORAL AS DIRECTED
Qty: 1 | Refills: 0 | OUTPATIENT
Start: 2014-11-14 | End: 2014-12-18

## 2022-06-26 ENCOUNTER — TELEPHONE ENCOUNTER (OUTPATIENT)
Age: 82
End: 2022-06-26

## 2022-06-26 RX ORDER — LINACLOTIDE 145 UG/1
CAPSULE, GELATIN COATED ORAL DAILY
Qty: 30 | Refills: 30 | Status: ACTIVE | COMMUNITY
Start: 2014-12-18

## 2022-06-26 RX ORDER — COLESEVELAM HYDROCHLORIDE 625 MG/1
WELCHOL( 625MG ORAL 2 TABLETS BID BID ) ACTIVE -HX ENTRY TABLET, FILM COATED ORAL BID
Status: ACTIVE | COMMUNITY
Start: 2014-12-18

## 2022-06-26 RX ORDER — FUROSEMIDE 20 MG/1
LASIX( 20MG ORAL 1/2 TAB DAILY ) ACTIVE -HX ENTRY TABLET ORAL DAILY
Status: ACTIVE | COMMUNITY
Start: 2014-12-18

## 2022-06-26 RX ORDER — ESTRADIOL 2 MG/1
ESTRING( 2MG VAGINAL  AS DIRECTED ) ACTIVE -HX ENTRY RING VAGINAL AS DIRECTED
Status: ACTIVE | COMMUNITY
Start: 2014-12-18

## 2022-06-26 RX ORDER — CYCLOBENZAPRINE HYDROCHLORIDE 10 MG/1
FLEXERIL( 10MG ORAL  DAILY ) ACTIVE -HX ENTRY TABLET, FILM COATED ORAL DAILY
Status: ACTIVE | COMMUNITY
Start: 2014-12-18

## 2022-06-26 RX ORDER — EZETIMIBE 10 MG/1
ZETIA( 10MG ORAL  DAILY ) ACTIVE -HX ENTRY TABLET ORAL DAILY
Status: ACTIVE | COMMUNITY
Start: 2014-12-18

## 2022-06-26 RX ORDER — PROGESTERONE 100 MG/1
PROMETRIUM( 100MG ORAL  AS DIRECTED ) ACTIVE -HX ENTRY CAPSULE ORAL AS DIRECTED
Status: ACTIVE | COMMUNITY
Start: 2014-12-18

## 2022-06-26 RX ORDER — VALSARTAN 80 MG
DIOVAN( 80MG ORAL  DAILY ) ACTIVE -HX ENTRY TABLET ORAL DAILY
Status: ACTIVE | COMMUNITY
Start: 2014-12-18

## 2022-06-26 RX ORDER — METOPROLOL SUCCINATE 50 MG/1
TOPROL XL( 50MG ORAL 1 1/2 TABS DAILY ) ACTIVE -HX ENTRY TABLET, EXTENDED RELEASE ORAL DAILY
Status: ACTIVE | COMMUNITY
Start: 2014-12-18

## 2022-08-22 ENCOUNTER — FOLLOW UP (OUTPATIENT)
Dept: URBAN - METROPOLITAN AREA CLINIC 32 | Facility: CLINIC | Age: 82
End: 2022-08-22

## 2022-08-22 DIAGNOSIS — H04.123: ICD-10-CM

## 2022-08-22 DIAGNOSIS — H35.373: ICD-10-CM

## 2022-08-22 DIAGNOSIS — H40.1132: ICD-10-CM

## 2022-08-22 PROCEDURE — 92012 INTRM OPH EXAM EST PATIENT: CPT

## 2022-08-22 PROCEDURE — 92083 EXTENDED VISUAL FIELD XM: CPT

## 2022-08-22 ASSESSMENT — VISUAL ACUITY
OD_SC: 20/25-1
OS_SC: 20/25-1

## 2022-08-22 ASSESSMENT — KERATOMETRY
OS_AXISANGLE_DEGREES: 70
OS_AXISANGLE2_DEGREES: 160
OD_K2POWER_DIOPTERS: 45.25
OS_K1POWER_DIOPTERS: 46.25
OS_K2POWER_DIOPTERS: 45.25
OD_AXISANGLE2_DEGREES: 10
OD_AXISANGLE_DEGREES: 100
OD_K1POWER_DIOPTERS: 46.25

## 2022-08-22 ASSESSMENT — TONOMETRY
OS_IOP_MMHG: 11
OD_IOP_MMHG: 10

## 2022-09-19 ENCOUNTER — FOLLOW UP (OUTPATIENT)
Dept: URBAN - METROPOLITAN AREA CLINIC 33 | Facility: CLINIC | Age: 82
End: 2022-09-19

## 2022-09-19 DIAGNOSIS — H35.373: ICD-10-CM

## 2022-09-19 DIAGNOSIS — H02.833: ICD-10-CM

## 2022-09-19 DIAGNOSIS — H02.836: ICD-10-CM

## 2022-09-19 DIAGNOSIS — H40.1132: ICD-10-CM

## 2022-09-19 DIAGNOSIS — H04.123: ICD-10-CM

## 2022-09-19 DIAGNOSIS — H44.23: ICD-10-CM

## 2022-09-19 DIAGNOSIS — H34.8320: ICD-10-CM

## 2022-09-19 DIAGNOSIS — H35.3131: ICD-10-CM

## 2022-09-19 DIAGNOSIS — H43.813: ICD-10-CM

## 2022-09-19 PROCEDURE — 92250 FUNDUS PHOTOGRAPHY W/I&R: CPT

## 2022-09-19 PROCEDURE — 92014 COMPRE OPH EXAM EST PT 1/>: CPT

## 2022-09-19 PROCEDURE — 92134 CPTRZ OPH DX IMG PST SGM RTA: CPT

## 2022-09-19 ASSESSMENT — VISUAL ACUITY
OD_SC: 20/25-1
OS_SC: 20/25-2

## 2022-09-19 ASSESSMENT — TONOMETRY
OD_IOP_MMHG: 15
OS_IOP_MMHG: 13

## 2022-12-22 NOTE — PATIENT DISCUSSION
No Retinal holes, Tears or Detachments. Vital Signs: I have reviewed the initial vital signs.  Constitutional: well-nourished, appears stated age, no acute distress  Head: atraumatic and normocephalic  Eyes:PERRLA, EOMI, clear conjunctiva  ENT: TM b/l clear,  MMM  Cardiovascular: regular rate, regular rhythm, well-perfused extremities  Respiratory: unlabored respiratory effort, clear to auscultation bilaterally  Gastrointestinal: soft, non-tender abdomen, no pulsatile mass  Neuro: awake, alert, follows commands, oriented, no focal deficits, GCS 15  ;

## 2023-04-12 ENCOUNTER — COMPREHENSIVE EXAM (OUTPATIENT)
Dept: URBAN - METROPOLITAN AREA CLINIC 33 | Facility: CLINIC | Age: 83
End: 2023-04-12

## 2023-04-12 DIAGNOSIS — H40.1132: ICD-10-CM

## 2023-04-12 DIAGNOSIS — H02.836: ICD-10-CM

## 2023-04-12 DIAGNOSIS — H35.3131: ICD-10-CM

## 2023-04-12 DIAGNOSIS — H35.373: ICD-10-CM

## 2023-04-12 DIAGNOSIS — H34.8320: ICD-10-CM

## 2023-04-12 DIAGNOSIS — H02.833: ICD-10-CM

## 2023-04-12 DIAGNOSIS — H43.813: ICD-10-CM

## 2023-04-12 DIAGNOSIS — H04.123: ICD-10-CM

## 2023-04-12 DIAGNOSIS — H44.23: ICD-10-CM

## 2023-04-12 PROCEDURE — 92134 CPTRZ OPH DX IMG PST SGM RTA: CPT

## 2023-04-12 PROCEDURE — 92250 FUNDUS PHOTOGRAPHY W/I&R: CPT

## 2023-04-12 PROCEDURE — 92014 COMPRE OPH EXAM EST PT 1/>: CPT

## 2023-04-12 ASSESSMENT — VISUAL ACUITY
OD_CC: 20/25-1
OS_CC: 20/40-2

## 2023-04-12 ASSESSMENT — TONOMETRY
OS_IOP_MMHG: 11
OD_IOP_MMHG: 12

## 2023-09-25 ENCOUNTER — FOLLOW UP (OUTPATIENT)
Dept: URBAN - METROPOLITAN AREA CLINIC 32 | Facility: CLINIC | Age: 83
End: 2023-09-25

## 2023-09-25 DIAGNOSIS — H34.8322: ICD-10-CM

## 2023-09-25 DIAGNOSIS — H40.1132: ICD-10-CM

## 2023-09-25 PROCEDURE — 92250 FUNDUS PHOTOGRAPHY W/I&R: CPT

## 2023-09-25 PROCEDURE — 92083 EXTENDED VISUAL FIELD XM: CPT

## 2023-09-25 PROCEDURE — 99213 OFFICE O/P EST LOW 20 MIN: CPT

## 2023-09-25 ASSESSMENT — KERATOMETRY
OS_K2POWER_DIOPTERS: 45.50
OD_K1POWER_DIOPTERS: 46.25
OS_K1POWER_DIOPTERS: 46.00
OD_AXISANGLE2_DEGREES: 10
OS_AXISANGLE_DEGREES: 77
OD_K2POWER_DIOPTERS: 45.25
OD_AXISANGLE_DEGREES: 100
OS_AXISANGLE2_DEGREES: 167

## 2023-09-25 ASSESSMENT — VISUAL ACUITY
OD_CC: 20/30+2
OS_CC: 20/30-2

## 2023-09-25 ASSESSMENT — TONOMETRY
OD_IOP_MMHG: 18
OD_IOP_MMHG: 15
OS_IOP_MMHG: 13

## 2023-10-11 ENCOUNTER — COMPREHENSIVE EXAM (OUTPATIENT)
Dept: URBAN - METROPOLITAN AREA CLINIC 33 | Facility: CLINIC | Age: 83
End: 2023-10-11

## 2023-10-11 DIAGNOSIS — H35.3131: ICD-10-CM

## 2023-10-11 DIAGNOSIS — H35.373: ICD-10-CM

## 2023-10-11 DIAGNOSIS — H34.8320: ICD-10-CM

## 2023-10-11 DIAGNOSIS — H02.836: ICD-10-CM

## 2023-10-11 DIAGNOSIS — H04.123: ICD-10-CM

## 2023-10-11 DIAGNOSIS — H02.833: ICD-10-CM

## 2023-10-11 DIAGNOSIS — H44.23: ICD-10-CM

## 2023-10-11 DIAGNOSIS — H43.813: ICD-10-CM

## 2023-10-11 DIAGNOSIS — H40.1132: ICD-10-CM

## 2023-10-11 PROCEDURE — 92014 COMPRE OPH EXAM EST PT 1/>: CPT

## 2023-10-11 PROCEDURE — 92250 FUNDUS PHOTOGRAPHY W/I&R: CPT

## 2023-10-11 PROCEDURE — 92134 CPTRZ OPH DX IMG PST SGM RTA: CPT

## 2023-10-11 ASSESSMENT — TONOMETRY
OS_IOP_MMHG: 11
OD_IOP_MMHG: 11

## 2023-10-11 ASSESSMENT — VISUAL ACUITY
OD_SC: 20/30-1
OS_SC: 20/25-1

## 2024-02-19 ENCOUNTER — COMPREHENSIVE EXAM (OUTPATIENT)
Dept: URBAN - METROPOLITAN AREA CLINIC 32 | Facility: CLINIC | Age: 84
End: 2024-02-19

## 2024-02-19 DIAGNOSIS — H40.1132: ICD-10-CM

## 2024-02-19 DIAGNOSIS — H34.8322: ICD-10-CM

## 2024-02-19 DIAGNOSIS — H16.223: ICD-10-CM

## 2024-02-19 DIAGNOSIS — H10.213: ICD-10-CM

## 2024-02-19 DIAGNOSIS — Z96.1: ICD-10-CM

## 2024-02-19 DIAGNOSIS — H35.373: ICD-10-CM

## 2024-02-19 PROCEDURE — 92133 CPTRZD OPH DX IMG PST SGM ON: CPT

## 2024-02-19 PROCEDURE — 99214 OFFICE O/P EST MOD 30 MIN: CPT

## 2024-02-19 ASSESSMENT — VISUAL ACUITY
OS_SC: J5
OS_SC: 20/30-2
OD_SC: 20/30+2
OD_SC: J1-1

## 2024-02-19 ASSESSMENT — TONOMETRY
OD_IOP_MMHG: 15
OS_IOP_MMHG: 19

## 2024-02-19 ASSESSMENT — KERATOMETRY
OS_K1POWER_DIOPTERS: 45.25
OS_AXISANGLE_DEGREES: 100
OS_AXISANGLE2_DEGREES: 10
OD_K1POWER_DIOPTERS: 46.25
OD_AXISANGLE_DEGREES: 105
OS_K2POWER_DIOPTERS: 44.50
OD_K2POWER_DIOPTERS: 45.25
OD_AXISANGLE2_DEGREES: 15

## 2024-02-27 ENCOUNTER — FOLLOW UP (OUTPATIENT)
Dept: URBAN - METROPOLITAN AREA CLINIC 32 | Facility: CLINIC | Age: 84
End: 2024-02-27

## 2024-02-27 DIAGNOSIS — H40.1132: ICD-10-CM

## 2024-02-27 DIAGNOSIS — H10.213: ICD-10-CM

## 2024-02-27 PROCEDURE — 99212 OFFICE O/P EST SF 10 MIN: CPT

## 2024-02-27 ASSESSMENT — KERATOMETRY
OD_K2POWER_DIOPTERS: 45.25
OS_AXISANGLE2_DEGREES: 10
OD_AXISANGLE_DEGREES: 105
OD_K1POWER_DIOPTERS: 46.25
OD_AXISANGLE2_DEGREES: 15
OS_K2POWER_DIOPTERS: 44.50
OS_K1POWER_DIOPTERS: 45.25
OS_AXISANGLE_DEGREES: 100

## 2024-02-27 ASSESSMENT — VISUAL ACUITY
OS_SC: 20/50
OD_SC: 20/40+1

## 2024-02-27 ASSESSMENT — TONOMETRY
OS_IOP_MMHG: 15
OD_IOP_MMHG: 15

## 2024-03-06 ENCOUNTER — FOLLOW UP (OUTPATIENT)
Dept: URBAN - METROPOLITAN AREA CLINIC 32 | Facility: CLINIC | Age: 84
End: 2024-03-06

## 2024-03-06 DIAGNOSIS — H40.1132: ICD-10-CM

## 2024-03-06 DIAGNOSIS — H10.213: ICD-10-CM

## 2024-03-06 PROCEDURE — 99213 OFFICE O/P EST LOW 20 MIN: CPT

## 2024-03-06 ASSESSMENT — TONOMETRY
OS_IOP_MMHG: 18
OD_IOP_MMHG: 18

## 2024-03-06 ASSESSMENT — KERATOMETRY
OD_K1POWER_DIOPTERS: 46.25
OS_AXISANGLE2_DEGREES: 10
OD_AXISANGLE_DEGREES: 105
OS_K1POWER_DIOPTERS: 45.25
OS_AXISANGLE_DEGREES: 100
OD_K2POWER_DIOPTERS: 45.25
OD_AXISANGLE2_DEGREES: 15
OS_K2POWER_DIOPTERS: 44.50

## 2024-03-06 ASSESSMENT — VISUAL ACUITY
OD_SC: 20/30-2
OS_SC: 20/50-1

## 2024-03-20 ENCOUNTER — FOLLOW UP (OUTPATIENT)
Dept: URBAN - METROPOLITAN AREA CLINIC 32 | Facility: CLINIC | Age: 84
End: 2024-03-20

## 2024-03-20 DIAGNOSIS — H40.1132: ICD-10-CM

## 2024-03-20 PROCEDURE — 99214 OFFICE O/P EST MOD 30 MIN: CPT

## 2024-03-20 ASSESSMENT — KERATOMETRY
OS_K1POWER_DIOPTERS: 45.25
OS_AXISANGLE_DEGREES: 100
OD_AXISANGLE_DEGREES: 105
OD_AXISANGLE2_DEGREES: 15
OD_K2POWER_DIOPTERS: 45.25
OD_K1POWER_DIOPTERS: 46.25
OS_K2POWER_DIOPTERS: 44.50
OS_AXISANGLE2_DEGREES: 10

## 2024-03-20 ASSESSMENT — VISUAL ACUITY
OS_SC: 20/50
OD_SC: 20/40

## 2024-03-20 ASSESSMENT — TONOMETRY
OS_IOP_MMHG: 12
OD_IOP_MMHG: 15

## 2024-04-17 ENCOUNTER — FOLLOW UP (OUTPATIENT)
Dept: URBAN - METROPOLITAN AREA CLINIC 33 | Facility: CLINIC | Age: 84
End: 2024-04-17

## 2024-04-17 VITALS — BODY MASS INDEX: 23.18 KG/M2 | HEIGHT: 59 IN | WEIGHT: 115 LBS

## 2024-04-17 DIAGNOSIS — H40.1132: ICD-10-CM

## 2024-04-17 DIAGNOSIS — H43.813: ICD-10-CM

## 2024-04-17 DIAGNOSIS — H35.3131: ICD-10-CM

## 2024-04-17 DIAGNOSIS — H35.373: ICD-10-CM

## 2024-04-17 DIAGNOSIS — H04.123: ICD-10-CM

## 2024-04-17 DIAGNOSIS — H02.833: ICD-10-CM

## 2024-04-17 DIAGNOSIS — H44.23: ICD-10-CM

## 2024-04-17 DIAGNOSIS — H34.8320: ICD-10-CM

## 2024-04-17 DIAGNOSIS — H02.836: ICD-10-CM

## 2024-04-17 PROCEDURE — 92134 CPTRZ OPH DX IMG PST SGM RTA: CPT

## 2024-04-17 PROCEDURE — 92250 FUNDUS PHOTOGRAPHY W/I&R: CPT

## 2024-04-17 PROCEDURE — 92014 COMPRE OPH EXAM EST PT 1/>: CPT

## 2024-04-17 ASSESSMENT — VISUAL ACUITY
OS_SC: 20/30+1
OD_SC: 20/25+1

## 2024-04-17 ASSESSMENT — TONOMETRY
OD_IOP_MMHG: 16
OS_IOP_MMHG: 14

## 2024-09-09 ENCOUNTER — FOLLOW UP (OUTPATIENT)
Dept: URBAN - METROPOLITAN AREA CLINIC 32 | Facility: CLINIC | Age: 84
End: 2024-09-09

## 2024-09-09 DIAGNOSIS — H40.1132: ICD-10-CM

## 2024-09-09 DIAGNOSIS — H16.223: ICD-10-CM

## 2024-09-09 PROCEDURE — 99213 OFFICE O/P EST LOW 20 MIN: CPT

## 2024-09-09 PROCEDURE — 92083 EXTENDED VISUAL FIELD XM: CPT

## 2024-09-09 PROCEDURE — 92250 FUNDUS PHOTOGRAPHY W/I&R: CPT

## 2025-01-06 ENCOUNTER — COMPREHENSIVE EXAM (OUTPATIENT)
Age: 85
End: 2025-01-06

## 2025-01-06 VITALS — BODY MASS INDEX: 23.18 KG/M2 | WEIGHT: 115 LBS | HEIGHT: 59 IN

## 2025-01-06 DIAGNOSIS — H02.833: ICD-10-CM

## 2025-01-06 DIAGNOSIS — H35.373: ICD-10-CM

## 2025-01-06 DIAGNOSIS — H43.811: ICD-10-CM

## 2025-01-06 DIAGNOSIS — Z96.1: ICD-10-CM

## 2025-01-06 DIAGNOSIS — H34.8320: ICD-10-CM

## 2025-01-06 DIAGNOSIS — H40.1132: ICD-10-CM

## 2025-01-06 DIAGNOSIS — H35.3131: ICD-10-CM

## 2025-01-06 DIAGNOSIS — H44.23: ICD-10-CM

## 2025-01-06 DIAGNOSIS — H04.123: ICD-10-CM

## 2025-01-06 DIAGNOSIS — H02.836: ICD-10-CM

## 2025-01-06 PROCEDURE — 92134 CPTRZ OPH DX IMG PST SGM RTA: CPT

## 2025-01-06 PROCEDURE — 92014 COMPRE OPH EXAM EST PT 1/>: CPT

## 2025-01-06 PROCEDURE — 92250 FUNDUS PHOTOGRAPHY W/I&R: CPT

## 2025-02-03 ENCOUNTER — COMPREHENSIVE EXAM (OUTPATIENT)
Age: 85
End: 2025-02-03

## 2025-02-03 DIAGNOSIS — H34.8322: ICD-10-CM

## 2025-02-03 DIAGNOSIS — H35.373: ICD-10-CM

## 2025-02-03 DIAGNOSIS — H40.1132: ICD-10-CM

## 2025-02-03 DIAGNOSIS — H16.223: ICD-10-CM

## 2025-02-03 PROCEDURE — 92133 CPTRZD OPH DX IMG PST SGM ON: CPT

## 2025-02-03 PROCEDURE — 92014 COMPRE OPH EXAM EST PT 1/>: CPT

## 2025-07-22 ENCOUNTER — FOLLOW UP (OUTPATIENT)
Age: 85
End: 2025-07-22

## 2025-07-22 VITALS — WEIGHT: 114 LBS | HEIGHT: 59 IN | BODY MASS INDEX: 22.98 KG/M2

## 2025-07-22 DIAGNOSIS — H02.833: ICD-10-CM

## 2025-07-22 DIAGNOSIS — Z96.1: ICD-10-CM

## 2025-07-22 DIAGNOSIS — H40.1132: ICD-10-CM

## 2025-07-22 DIAGNOSIS — H02.836: ICD-10-CM

## 2025-07-22 DIAGNOSIS — H04.123: ICD-10-CM

## 2025-07-22 DIAGNOSIS — H34.8320: ICD-10-CM

## 2025-07-22 DIAGNOSIS — H35.3131: ICD-10-CM

## 2025-07-22 DIAGNOSIS — H44.23: ICD-10-CM

## 2025-07-22 DIAGNOSIS — H35.373: ICD-10-CM

## 2025-07-22 DIAGNOSIS — H43.811: ICD-10-CM

## 2025-07-22 PROCEDURE — 92134 CPTRZ OPH DX IMG PST SGM RTA: CPT

## 2025-07-22 PROCEDURE — 92250 FUNDUS PHOTOGRAPHY W/I&R: CPT

## 2025-07-22 PROCEDURE — 92014 COMPRE OPH EXAM EST PT 1/>: CPT

## 2025-07-22 RX ORDER — KETOROLAC TROMETHAMINE 5 MG/ML: 1 SOLUTION OPHTHALMIC TWICE A DAY

## 2025-08-18 ENCOUNTER — FOLLOW UP (OUTPATIENT)
Age: 85
End: 2025-08-18

## 2025-08-18 DIAGNOSIS — H34.8322: ICD-10-CM

## 2025-08-18 DIAGNOSIS — H35.373: ICD-10-CM

## 2025-08-18 DIAGNOSIS — H40.1132: ICD-10-CM

## 2025-08-18 PROCEDURE — 92250 FUNDUS PHOTOGRAPHY W/I&R: CPT

## 2025-08-18 PROCEDURE — 92083 EXTENDED VISUAL FIELD XM: CPT

## 2025-08-18 PROCEDURE — 99213 OFFICE O/P EST LOW 20 MIN: CPT
